# Patient Record
Sex: FEMALE | Race: WHITE | NOT HISPANIC OR LATINO | URBAN - METROPOLITAN AREA
[De-identification: names, ages, dates, MRNs, and addresses within clinical notes are randomized per-mention and may not be internally consistent; named-entity substitution may affect disease eponyms.]

---

## 2017-03-04 ENCOUNTER — EMERGENCY (EMERGENCY)
Facility: HOSPITAL | Age: 22
LOS: 1 days | Discharge: HOME | End: 2017-03-04
Attending: EMERGENCY MEDICINE | Admitting: EMERGENCY MEDICINE
Payer: COMMERCIAL

## 2017-03-04 VITALS
DIASTOLIC BLOOD PRESSURE: 90 MMHG | HEART RATE: 95 BPM | SYSTOLIC BLOOD PRESSURE: 136 MMHG | TEMPERATURE: 98 F | OXYGEN SATURATION: 100 % | RESPIRATION RATE: 18 BRPM

## 2017-03-04 VITALS
TEMPERATURE: 99 F | SYSTOLIC BLOOD PRESSURE: 141 MMHG | RESPIRATION RATE: 18 BRPM | OXYGEN SATURATION: 100 % | DIASTOLIC BLOOD PRESSURE: 90 MMHG | HEART RATE: 95 BPM

## 2017-03-04 DIAGNOSIS — R30.0 DYSURIA: ICD-10-CM

## 2017-03-04 DIAGNOSIS — N30.91 CYSTITIS, UNSPECIFIED WITH HEMATURIA: ICD-10-CM

## 2017-03-04 LAB
APPEARANCE UR: ABNORMAL
BILIRUB UR-MCNC: NEGATIVE — SIGNIFICANT CHANGE UP
COLOR SPEC: YELLOW — SIGNIFICANT CHANGE UP
DIFF PNL FLD: ABNORMAL
GLUCOSE UR QL: NEGATIVE — SIGNIFICANT CHANGE UP
KETONES UR-MCNC: NEGATIVE — SIGNIFICANT CHANGE UP
LEUKOCYTE ESTERASE UR-ACNC: ABNORMAL
NITRITE UR-MCNC: NEGATIVE — SIGNIFICANT CHANGE UP
PH UR: 6.5 — SIGNIFICANT CHANGE UP (ref 4.8–8)
PROT UR-MCNC: 150 MG/DL
RBC CASTS # UR COMP ASSIST: >50 /HPF (ref 0–2)
SP GR SPEC: 1.02 — SIGNIFICANT CHANGE UP (ref 1.01–1.02)
UROBILINOGEN FLD QL: NEGATIVE — SIGNIFICANT CHANGE UP
WBC UR QL: >50 /HPF (ref 0–5)

## 2017-03-04 PROCEDURE — 99283 EMERGENCY DEPT VISIT LOW MDM: CPT | Mod: 25

## 2017-03-04 PROCEDURE — 81001 URINALYSIS AUTO W/SCOPE: CPT

## 2017-03-04 PROCEDURE — 99283 EMERGENCY DEPT VISIT LOW MDM: CPT

## 2017-03-04 PROCEDURE — 87186 SC STD MICRODIL/AGAR DIL: CPT

## 2017-03-04 PROCEDURE — 87086 URINE CULTURE/COLONY COUNT: CPT

## 2017-03-04 RX ORDER — CEFPODOXIME PROXETIL 100 MG
1 TABLET ORAL
Qty: 20 | Refills: 0 | OUTPATIENT
Start: 2017-03-04 | End: 2017-03-14

## 2017-03-04 RX ORDER — CEFPODOXIME PROXETIL 100 MG
200 TABLET ORAL ONCE
Qty: 0 | Refills: 0 | Status: DISCONTINUED | OUTPATIENT
Start: 2017-03-04 | End: 2017-03-08

## 2017-03-04 RX ORDER — SERTRALINE 25 MG/1
1 TABLET, FILM COATED ORAL
Qty: 0 | Refills: 0 | COMMUNITY

## 2017-03-04 NOTE — ED PROVIDER NOTE - PLAN OF CARE
Take cefpodoxime 200 mg twice a day for 10 days. Take Tylenol 1000 mg or ibuprofen 600 mg every 6 hours with food as needed for pain. Drink plenty of fluids and get plenty of rest. Follow up with your primary doctor and an OBGYN. Return to the Emergency Dept if you develop any new or worsening symptoms especially fevers, worsening pain, or inability to urinate

## 2017-03-04 NOTE — ED PROVIDER NOTE - OBJECTIVE STATEMENT
21 year old female with PMHx of FMF presents with dysuria x 2 days, no fever, mild left-sided back pain. She has had UTIs in the past and states that this feels similar. Never complicated. Has not been sexually active in "a long time." Never treated for an STD. Endorses mild hematuria, no clots, but states she is due for her period so she is not sure if that is contributing. Has never had a pap smear but states that she knows an OBGYN near her home town (she lives in [place] but is visiting) and will go to them soon. Denies vaginal discharge or itching. Denies nausea or vomiting.

## 2017-03-04 NOTE — ED PROVIDER NOTE - ATTENDING CONTRIBUTION TO CARE
I, Dr. Robbie Barger, interviewed the patient and performed a physical exam and spoke to the resident about the plan of care for this patient.  Pt with dysuria and frequency, nontender abdomen.

## 2017-03-04 NOTE — ED PROVIDER NOTE - CARE PLAN
Principal Discharge DX:	Cystitis Principal Discharge DX:	Cystitis  Instructions for follow-up, activity and diet:	Take cefpodoxime 200 mg twice a day for 10 days. Take Tylenol 1000 mg or ibuprofen 600 mg every 6 hours with food as needed for pain. Drink plenty of fluids and get plenty of rest. Follow up with your primary doctor and an OBGYN. Return to the Emergency Dept if you develop any new or worsening symptoms especially fevers, worsening pain, or inability to urinate

## 2017-03-04 NOTE — ED PEDIATRIC NURSE NOTE - OBJECTIVE STATEMENT
20 yo female [facility]/o urinary pain/burning x2days. patient states she has pain after she urinates along with pain on lower left side of back. she also states she has scant blood in urine x2days but is supposed to have her period at this time. LMP is normal as per patient. patient denies vaginal odor, d/c, fever, chills, N/V/D, chest pain, SOB, HA, dizziness. patient is AAOx4. lung sounds clear bilaterally. cap refill <3sec. DIONY. MD at the bedside. 22 yo female [facility]/o urinary pain/burning x2days. patient states she has pain after she urinates along with pain on lower left side of back. she also states she has scant blood in urine x2days but is supposed to have her period at this time. patient urine is dark yellow, cloudy with odor. LMP is normal as per patient. patient denies vaginal odor, d/c, fever, chills, N/V/D, chest pain, SOB, HA, dizziness. patient is AAOx4. lung sounds clear bilaterally. cap refill <3sec. DIONY. MD at the bedside.
